# Patient Record
Sex: FEMALE | Race: ASIAN | Employment: FULL TIME | URBAN - NONMETROPOLITAN AREA
[De-identification: names, ages, dates, MRNs, and addresses within clinical notes are randomized per-mention and may not be internally consistent; named-entity substitution may affect disease eponyms.]

---

## 2018-10-14 ENCOUNTER — HOSPITAL ENCOUNTER (EMERGENCY)
Age: 58
Discharge: HOME | End: 2018-10-14

## 2018-10-14 VITALS
DIASTOLIC BLOOD PRESSURE: 60 MMHG | RESPIRATION RATE: 16 BRPM | TEMPERATURE: 97.8 F | SYSTOLIC BLOOD PRESSURE: 116 MMHG | OXYGEN SATURATION: 96 % | HEART RATE: 76 BPM
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
## 2018-10-14 PROCEDURE — 96372 THER/PROPH/DIAG INJ SC/IM: CPT

## 2018-10-14 PROCEDURE — 6360000002 HC RX W HCPCS: Performed by: NURSE PRACTITIONER

## 2018-10-14 PROCEDURE — 99202 OFFICE O/P NEW SF 15 MIN: CPT

## 2018-10-14 RX ORDER — PREDNISONE 10 MG/1
10 TABLET ORAL DAILY
Qty: 21 TABLET | Refills: 0 | Status: SHIPPED | OUTPATIENT
Start: 2018-10-14 | End: 2018-10-24

## 2018-10-14 RX ORDER — METHYLPREDNISOLONE ACETATE 80 MG/ML
80 INJECTION, SUSPENSION INTRA-ARTICULAR; INTRALESIONAL; INTRAMUSCULAR; SOFT TISSUE ONCE
Status: COMPLETED | OUTPATIENT
Start: 2018-10-14 | End: 2018-10-14

## 2018-10-14 RX ADMIN — METHYLPREDNISOLONE ACETATE 80 MG: 80 INJECTION, SUSPENSION INTRA-ARTICULAR; INTRALESIONAL; INTRAMUSCULAR; SOFT TISSUE at 13:04

## 2018-10-14 ASSESSMENT — ENCOUNTER SYMPTOMS
EYE ITCHING: 0
EYE PAIN: 0
SORE THROAT: 0
RHINORRHEA: 0
NAUSEA: 0
DIARRHEA: 0
ABDOMINAL PAIN: 0
TROUBLE SWALLOWING: 0
COUGH: 0
EYE REDNESS: 0
BACK PAIN: 0
VOMITING: 0
CONSTIPATION: 0
WHEEZING: 0
CHEST TIGHTNESS: 0
SHORTNESS OF BREATH: 0
EYE DISCHARGE: 0
SINUS PRESSURE: 0

## 2022-02-11 ENCOUNTER — HOSPITAL ENCOUNTER (OUTPATIENT)
Age: 62
Setting detail: SPECIMEN
Discharge: HOME | End: 2022-02-11

## 2022-02-11 LAB — T. PALLIDUM, IGG: NONREACTIVE

## 2022-02-14 LAB
C. TRACHOMATIS DNA ,URINE: NEGATIVE
N. GONORRHOEAE DNA, URINE: NEGATIVE
QUANTI TB GOLD PLUS: NEGATIVE
QUANTI TB1 MINUS NIL: 0.02 IU/ML (ref 0–0.34)
QUANTI TB2 MINUS NIL: 0.05 IU/ML (ref 0–0.34)
QUANTIFERON MITOGEN: >10 IU/ML
QUANTIFERON NIL: 0.13 IU/ML
SPECIMEN DESCRIPTION: NORMAL

## 2022-02-16 LAB
SOURCE: NORMAL
TRICHOMONAS VAGINALI, MOLECULAR: NEGATIVE

## 2022-05-31 ENCOUNTER — APPOINTMENT (OUTPATIENT)
Dept: GENERAL RADIOLOGY | Age: 62
End: 2022-05-31
Payer: COMMERCIAL

## 2022-05-31 ENCOUNTER — HOSPITAL ENCOUNTER (EMERGENCY)
Age: 62
Discharge: HOME | End: 2022-05-31
Payer: COMMERCIAL

## 2022-05-31 VITALS
RESPIRATION RATE: 14 BRPM | HEIGHT: 64 IN | BODY MASS INDEX: 24.75 KG/M2 | WEIGHT: 145 LBS | TEMPERATURE: 97.8 F | SYSTOLIC BLOOD PRESSURE: 122 MMHG | OXYGEN SATURATION: 99 % | DIASTOLIC BLOOD PRESSURE: 62 MMHG | HEART RATE: 70 BPM

## 2022-05-31 DIAGNOSIS — S80.02XA CONTUSION OF LEFT KNEE, INITIAL ENCOUNTER: Primary | ICD-10-CM

## 2022-05-31 DIAGNOSIS — S80.01XA CONTUSION OF RIGHT KNEE, INITIAL ENCOUNTER: ICD-10-CM

## 2022-05-31 PROCEDURE — 73564 X-RAY EXAM KNEE 4 OR MORE: CPT

## 2022-05-31 PROCEDURE — 99213 OFFICE O/P EST LOW 20 MIN: CPT | Performed by: NURSE PRACTITIONER

## 2022-05-31 PROCEDURE — G0463 HOSPITAL OUTPT CLINIC VISIT: HCPCS

## 2022-05-31 PROCEDURE — 99213 OFFICE O/P EST LOW 20 MIN: CPT

## 2022-05-31 ASSESSMENT — ENCOUNTER SYMPTOMS
NAUSEA: 0
VOMITING: 0

## 2022-05-31 NOTE — ED PROVIDER NOTES
[address]  Urgent Care Encounter       CHIEF COMPLAINT       Chief Complaint   Patient presents with    Knee Injury     fell at work bilateral knee pain       Nurses Notes reviewed and I agree except as noted in the HPI. HISTORY OF PRESENT ILLNESS   Benjamin Thomason is a 57 y.o. female who presents with complaints of bilateral knee pain. Patient fell at work on concrete 5 days ago and has continued pain in both knees over the kneecaps. She is able to walk but has increased pain climbing stairs and squatting. She has applied ice but has not taken any Tylenol or Motrin. Their chiropractor performed a \"tuning fork test\" and indicated to the patient that there may be a fracture in her knee. The history is provided by the patient. REVIEW OF SYSTEMS     Review of Systems   Constitutional: Negative for fever. Gastrointestinal: Negative for nausea and vomiting. Musculoskeletal:        Bilateral knee injury/pain   Skin: Negative for wound. Neurological: Negative for numbness. PAST MEDICAL HISTORY   History reviewed. No pertinent past medical history. SURGICALHISTORY     Patient  has no past surgical history on file. CURRENT MEDICATIONS       There are no discharge medications for this patient. ALLERGIES     Patient is has No Known Allergies. Patients   Immunization History   Administered Date(s) Administered    COVID-19, J&J, PF, 0.5 mL 02/11/2022       FAMILY HISTORY     Patient's family history is not on file. SOCIAL HISTORY     Patient  reports that she has never smoked. She has never used smokeless tobacco. She reports that she does not drink alcohol and does not use drugs. PHYSICAL EXAM     ED TRIAGE VITALS  BP: 122/62, Temp: 97.8 °F (36.6 °C), Heart Rate: 70, Resp: 14, SpO2: 99 %,Estimated body mass index is 24.89 kg/m² as calculated from the following:    Height as of this encounter: 5' 4\" (1.626 m).     Weight as of this encounter: 145 lb (65.8 kg).,No LMP recorded. Patient is postmenopausal.    Physical Exam  Vitals and nursing note reviewed. Constitutional:       General: She is not in acute distress. Appearance: She is well-developed. HENT:      Head: Normocephalic and atraumatic. Pulmonary:      Effort: Pulmonary effort is normal. No respiratory distress. Musculoskeletal:      Right knee: Swelling (Mild anteriorly) present. No erythema or ecchymosis. Normal range of motion. Tenderness (Patella) present. No patellar tendon tenderness. No LCL laxity. Normal patellar mobility. Left knee: Swelling (Mild anteriorly) present. No erythema or ecchymosis. Normal range of motion. Tenderness (Patella) present. No patellar tendon tenderness. No LCL laxity. Normal patellar mobility. Skin:     General: Skin is warm and dry. Neurological:      General: No focal deficit present. Mental Status: She is alert and oriented to person, place, and time. Psychiatric:         Mood and Affect: Mood normal.         Speech: Speech normal.         Behavior: Behavior normal. Behavior is cooperative. DIAGNOSTIC RESULTS     Labs:No results found for this visit on 05/31/22. IMAGING:    XR KNEE LEFT (MIN 4 VIEWS)   Final Result   1. No acute bony abnormality. Mild osteoarthritis. This document has been electronically signed by: Shaun Lorenz MD on    05/31/2022 07:22 PM      XR KNEE RIGHT (MIN 4 VIEWS)   Final Result   1. There appears to be at least one small posterior intra-articular body. 2. Mild osteoarthritis. This document has been electronically signed by: Shaun Lorenz MD on    05/31/2022 07:19 PM            EKG:      URGENT CARE COURSE:     Vitals:    05/31/22 1800   BP: 122/62   Pulse: 70   Resp: 14   Temp: 97.8 °F (36.6 °C)   TempSrc: Temporal   SpO2: 99%   Weight: 145 lb (65.8 kg)   Height: 5' 4\" (1.626 m)       Medications - No data to display         PROCEDURES:  None    FINAL IMPRESSION      1.  Contusion of left knee, initial encounter    2. Contusion of right knee, initial encounter          DISPOSITION/ PLAN     Patient presents with contusion of bilateral knees after a fall at work. .  X-rays were negative for acute fracture dislocation. Tylenol and or Motrin for pain. Ice as desired. Can use Ace wraps or pull-up knee sleeves for support and comfort. Follow-up with occupational health office with any further needs regarding this injury. Further instructions were outlined verbally and in the patient's discharge instructions. All the patient's questions were answered. The patient/parent agreed with the plan and was discharged from the [facility] in good condition. PATIENT REFERRED TO:  No primary care provider on file. No primary physician on file. DISCHARGE MEDICATIONS:  There are no discharge medications for this patient. There are no discharge medications for this patient. There are no discharge medications for this patient.       ASHLIE Carter CNP    (Please note that portions of this note were completed with a voice recognition program. Efforts were made to edit the dictations but occasionally words are mis-transcribed.)         ASHLIE Carter CNP  06/01/22 1011

## 2022-05-31 NOTE — ED NOTES
No change in patients condition. Discharge instructions discussed with pt and pt verbalized understanding of info given. Pt left stable and ambulated to exit.        Marjorie Adkins RN  05/31/22 8292

## 2023-03-10 ENCOUNTER — HOSPITAL ENCOUNTER (EMERGENCY)
Age: 63
Discharge: HOME | End: 2023-03-10
Payer: COMMERCIAL

## 2023-03-10 VITALS
OXYGEN SATURATION: 98 % | HEART RATE: 71 BPM | RESPIRATION RATE: 14 BRPM | TEMPERATURE: 98.1 F | SYSTOLIC BLOOD PRESSURE: 139 MMHG | DIASTOLIC BLOOD PRESSURE: 72 MMHG

## 2023-03-10 DIAGNOSIS — M77.8 LEFT ELBOW TENDINITIS: Primary | ICD-10-CM

## 2023-03-10 PROCEDURE — 99213 OFFICE O/P EST LOW 20 MIN: CPT

## 2023-03-10 RX ORDER — IBUPROFEN 400 MG/1
400 TABLET ORAL EVERY 6 HOURS PRN
COMMUNITY

## 2023-03-10 RX ORDER — ACETAMINOPHEN 500 MG
1000 TABLET ORAL EVERY 6 HOURS PRN
COMMUNITY

## 2023-03-10 ASSESSMENT — ENCOUNTER SYMPTOMS
SORE THROAT: 0
DIARRHEA: 0
SHORTNESS OF BREATH: 0
CHEST TIGHTNESS: 0
NAUSEA: 0
RHINORRHEA: 0
COUGH: 0
VOMITING: 0

## 2023-03-10 ASSESSMENT — PAIN DESCRIPTION - FREQUENCY: FREQUENCY: INTERMITTENT

## 2023-03-10 ASSESSMENT — PAIN - FUNCTIONAL ASSESSMENT
PAIN_FUNCTIONAL_ASSESSMENT: ACTIVITIES ARE NOT PREVENTED
PAIN_FUNCTIONAL_ASSESSMENT: 0-10

## 2023-03-10 ASSESSMENT — PAIN SCALES - GENERAL: PAINLEVEL_OUTOF10: 6

## 2023-03-10 ASSESSMENT — PAIN DESCRIPTION - ONSET: ONSET: GRADUAL

## 2023-03-10 ASSESSMENT — PAIN DESCRIPTION - DESCRIPTORS: DESCRIPTORS: ACHING;PINS AND NEEDLES;TINGLING

## 2023-03-10 ASSESSMENT — PAIN DESCRIPTION - ORIENTATION: ORIENTATION: LEFT

## 2023-03-10 ASSESSMENT — PAIN DESCRIPTION - PAIN TYPE: TYPE: ACUTE PAIN

## 2023-03-10 NOTE — ED PROVIDER NOTES
[facility]  Urgent Care Encounter       CHIEF COMPLAINT       Chief Complaint   Patient presents with    Arm Pain     Left elbow pain from working on a station at work continuously          Nurses Notes reviewed and I agree except as noted in the HPI.  HISTORY OF PRESENT ILLNESS   Isabela Fish is a 62 y.o. female who presents to the [facility] for evaluation of left elbow pain.  She reports the pain started roughly 1 year ago and has been intermittent.  She reports that the pain is worse after working a certain position at work.  She reports that she did discuss this with her supervisors.  She reports that they did take her off of this position, but returned her back to this position.  She reports the left elbow pain is worse with movement.  She is hoping to get some kind of note so she does not have to do this station at work that causes her left arm pain.    The history is provided by the patient. No  was used.     REVIEW OF SYSTEMS     Review of Systems   Constitutional:  Negative for activity change, appetite change, chills, fatigue and fever.   HENT:  Negative for ear discharge, ear pain, rhinorrhea and sore throat.    Respiratory:  Negative for cough, chest tightness and shortness of breath.    Cardiovascular:  Negative for chest pain.   Gastrointestinal:  Negative for diarrhea, nausea and vomiting.   Genitourinary:  Negative for dysuria.   Musculoskeletal:  Positive for arthralgias.   Skin:  Negative for rash.   Allergic/Immunologic: Negative for environmental allergies and food allergies.   Neurological:  Negative for dizziness and headaches.     PAST MEDICAL HISTORY   No past medical history on file.    SURGICALHISTORY     Patient  has no past surgical history on file.    CURRENT MEDICATIONS       Previous Medications    No medications on file       ALLERGIES     Patient is has No Known Allergies.    Patients   Immunization History  Administered Date(s) Administered    COVID-19, J&J, (age 18y+), IM, 0.5 mL 02/11/2022       FAMILY HISTORY     Patient's family history is not on file. SOCIAL HISTORY     Patient  reports that she has never smoked. She has never used smokeless tobacco. She reports that she does not drink alcohol and does not use drugs. PHYSICAL EXAM     ED TRIAGE VITALS   ,  ,  ,  ,  ,Estimated body mass index is 24.89 kg/m² as calculated from the following:    Height as of 5/31/22: 5' 4\" (1.626 m). Weight as of 5/31/22: 145 lb (65.8 kg). ,No LMP recorded. Patient is postmenopausal.    Physical Exam  Vitals and nursing note reviewed. Constitutional:       General: She is not in acute distress. Appearance: Normal appearance. She is not ill-appearing, toxic-appearing or diaphoretic. HENT:      Head: Normocephalic. Right Ear: Ear canal and external ear normal.      Left Ear: Ear canal and external ear normal.      Nose: Nose normal. No congestion or rhinorrhea. Mouth/Throat:      Mouth: Mucous membranes are moist.      Pharynx: Oropharynx is clear. No oropharyngeal exudate or posterior oropharyngeal erythema. Cardiovascular:      Rate and Rhythm: Normal rate. Pulses: Normal pulses. Pulmonary:      Effort: Pulmonary effort is normal. No respiratory distress. Breath sounds: No stridor. No wheezing or rhonchi. Abdominal:      General: Abdomen is flat. Bowel sounds are normal.      Palpations: Abdomen is soft. Musculoskeletal:         General: No swelling or tenderness. Normal range of motion. Arms:       Cervical back: Normal range of motion. Neurological:      General: No focal deficit present. Mental Status: She is alert and oriented to person, place, and time. Psychiatric:         Mood and Affect: Mood normal.         Behavior: Behavior normal.       DIAGNOSTIC RESULTS     Labs:No results found for this visit on 03/10/23.     IMAGING:    No orders to display         EKG: None      URGENT CARE COURSE:     There were no vitals filed for this visit. Medications - No data to display         PROCEDURES:  None    FINAL IMPRESSION      1. Left elbow tendinitis          DISPOSITION/ PLAN     Patient seen and evaluated for left elbow pain. I did discuss with the patient that her symptoms likely represent a tendinitis. She is instructed to follow-up with the occupational health through [facility] for further management of this condition. I did discuss ordering an x-ray at this time would likely not show any acute abnormalities. She is okay to return to work at full duty. She is instructed use over-the-counter Tylenol and Motrin for pain or fever. She can also follow-up with her PCP if needed. She is agreeable to above plan and denies questions or concerns at this time. PATIENT REFERRED TO:  No primary care provider on file. No primary physician on file. DISCHARGE MEDICATIONS:  New Prescriptions    No medications on file       Discontinued Medications    No medications on file       There are no discharge medications for this patient.       ASHLIE Hassan CNP    (Please note that portions of this note were completed with a voice recognition program. Efforts were made to edit the dictations but occasionally words are mis-transcribed.)            ASHLIE Hassan CNP  03/10/23 ASHLIE Swan CNP  03/10/23 0207

## 2023-03-10 NOTE — ED NOTES
Pt here with c/o pain to the left elbow from repeatedly using in the same motion doing her job at work. Pt states that she has been doing this station/job almost daily since August and she has been having varying degrees of pain when she has to do this #100-110 station/job. Pt adds that the days she does not have to this job and is able to rest the pain does get better. Pt reports that the pain is over the elbow and radiates up to the shoulder and down to the hand.       Jeana Rogers RN  03/10/23 7335

## 2023-04-03 ENCOUNTER — HOSPITAL ENCOUNTER (OUTPATIENT)
Dept: OCCUPATIONAL THERAPY | Age: 63
Setting detail: THERAPIES SERIES
Discharge: HOME | End: 2023-04-03

## 2023-04-03 PROCEDURE — 97110 THERAPEUTIC EXERCISES: CPT

## 2023-04-03 PROCEDURE — 97165 OT EVAL LOW COMPLEX 30 MIN: CPT

## 2023-04-03 PROCEDURE — 97033 APP MDLTY 1+IONTPHRSIS EA 15: CPT

## 2023-04-03 NOTE — PROGRESS NOTES
functional level    Short Term Goals:  Time Frame: 4 weeks   Patient will demonstrate ability to fully extend the left elbow without reports of pain to increase ability to reach into upper cupboards for dishes  Patient will report pain with activity no greater than 5/10 on the left and 2/10 on the right to increase ability to perform work duties  Patient will be independent with HEP to increase ability for performance of house chores. Long Term Goals:  Time Frame: 12 weeks   Patient will increase right  with elbow bent to 40# and left  to 30# with elbow bent to increase ability to open a jar. Patient will report average pain in the left no greater than 1/10 to increase ability to perform work duties. Patient Education:   [x]  HEP/Education Completed: Plan of Care, Goals  [employer] Access Code for HEP: elbow extension passive stretch, self massage to lateral epicondyle   []  No new Education completed  []  Reviewed Prior HEP      [x]  Patient verbalized and/or demonstrated understanding of education provided. []  Patient unable to verbalize and/or demonstrate understanding of education provided. Will continue education. [x]  Barriers to learning: use of     PLAN:  Treatment Recommendations: Strengthening, Range of Motion, Manual Therapy - Soft Tissue Mobilization, Manual Therapy - Joint Manipulation, Home Exercise Program, Patient Education, Integrative Dry Needling, Modalities, and Splint Fabrications/Modifications    [x]  Plan of care initiated. Plan to see patient 2 times per week for 12 weeks to address the treatment planned outlined above.   []  Continue with current plan of care  []  Modify plan of care as follows:    []  Hold pending physician visit  []  Discharge    Time In 1530   Time Out 1645   Timed Code Minutes: 10 min   Total Treatment Time: 75 min       Electronically Signed by: GREGG Esteban, OTR/L [id number]

## 2023-04-06 ENCOUNTER — HOSPITAL ENCOUNTER (OUTPATIENT)
Dept: OCCUPATIONAL THERAPY | Age: 63
Setting detail: THERAPIES SERIES
Discharge: HOME | End: 2023-04-06

## 2023-04-06 PROCEDURE — 97033 APP MDLTY 1+IONTPHRSIS EA 15: CPT

## 2023-04-06 PROCEDURE — 97140 MANUAL THERAPY 1/> REGIONS: CPT

## 2023-04-06 PROCEDURE — 97110 THERAPEUTIC EXERCISES: CPT

## 2023-04-06 NOTE — PROGRESS NOTES
HEP: elbow extension passive stretch, self massage to lateral epicondyle   []  No new Education completed  []  Reviewed Prior HEP      [x]  Patient verbalized and/or demonstrated understanding of education provided. []  Patient unable to verbalize and/or demonstrate understanding of education provided. Will continue education. [x]  Barriers to learning: use of     PLAN:  Treatment Recommendations: Strengthening, Range of Motion, Manual Therapy - Soft Tissue Mobilization, Manual Therapy - Joint Manipulation, Home Exercise Program, Patient Education, Integrative Dry Needling, Modalities, and Splint Fabrications/Modifications    []  Plan of care initiated. Plan to see patient 2 times per week for 12 weeks to address the treatment planned outlined above.   [x]  Continue with current plan of care  []  Modify plan of care as follows:    []  Hold pending physician visit  []  Discharge    Time In 1500   Time Out 1600   Timed Code Minutes: 30 min   Total Treatment Time: 60 min       Electronically Signed by: GREGG Nunez, OTR/L [id number]

## 2023-04-17 ENCOUNTER — HOSPITAL ENCOUNTER (OUTPATIENT)
Dept: OCCUPATIONAL THERAPY | Age: 63
Setting detail: THERAPIES SERIES
Discharge: HOME | End: 2023-04-17

## 2023-04-17 PROCEDURE — 97033 APP MDLTY 1+IONTPHRSIS EA 15: CPT

## 2023-04-17 PROCEDURE — 97140 MANUAL THERAPY 1/> REGIONS: CPT

## 2023-04-17 PROCEDURE — 97110 THERAPEUTIC EXERCISES: CPT

## 2023-04-17 NOTE — PROGRESS NOTES
[address]  [] EVALUATION  [x] DAILY NOTE (LAND) [] DAILY NOTE (AQUATIC ) [] PROGRESS NOTE [] DISCHARGE NOTE    [] [facility]   [x] [place] [address]    [] [facility]   [] Sivan Nava    Date: 2023  Patient Name:  Denae Castillo  : 1960  MRN: [id number]  CSN: [id number]    Referring Practitioner ASHLIE Arias - *   Diagnosis Lateral epicondylitis, left elbow [M77.12]  Lateral epicondylitis, right elbow [M77.11]    Treatment Diagnosis M77.12, M77.11   Date of Evaluation 4/3/23      Functional Outcome Measure Used UEFS   Functional Outcome Score 45/80 (4/3/23)       Insurance: Primary: Payor: / ,   Secondary:    Authorization Information: PRE CERTIFICATION REQUIRED: NO  INSURANCE THERAPY BENEFIT:  12 VISITS ALLOWED 2-3 TIMES A WEEK    Service ends 23   Visit # 5, 10 for progress note   Visits Allowed: 12   Recertification Date:    Physician Follow-Up: ?   Physician Orders: Left elbow with iontophoresis, 2-3x a week for 12 visits. Pertinent History: Per charting, patient reports that her pain started about a year ago and is intermittent. Report that she has pain after working a certain task at work. Reports that she was taking off of this task/job, but has returned to this task/job. Currently working at [address] [address] [place] [address],[facility] [address]. No lifting over 5#.      has no past medical history on file. SUBJECTIVE:  Patient reports that the massage was very beneficial from last visit.         OBJECTIVE:    TREATMENT   Precautions: Left pain greater than right, need    Pain:  4/10 left,  3/10 right elbow    X in shaded column indicates Activity Completed Today   Modalities Parameters/  Location  Notes/Comments   Ionto right and left lateral epicondyle at the region of pain 2ml, medium pad, right 0.6 mA, 25 minutes; left 0.5 mA, 25 minutes x Decreased due to patient request

## 2023-04-20 ENCOUNTER — HOSPITAL ENCOUNTER (OUTPATIENT)
Dept: OCCUPATIONAL THERAPY | Age: 63
Setting detail: THERAPIES SERIES
Discharge: HOME | End: 2023-04-20

## 2023-04-20 PROCEDURE — 97140 MANUAL THERAPY 1/> REGIONS: CPT

## 2023-04-20 PROCEDURE — 97033 APP MDLTY 1+IONTPHRSIS EA 15: CPT

## 2023-04-20 PROCEDURE — 97110 THERAPEUTIC EXERCISES: CPT

## 2023-04-20 NOTE — PROGRESS NOTES
[address]  [] EVALUATION  [x] DAILY NOTE (LAND) [] DAILY NOTE (AQUATIC ) [] PROGRESS NOTE [] DISCHARGE NOTE    [] [facility] [place]   [x] [place] [address]    [] [facility]   [] Kassie Shoulder    Date: 2023  Patient Name:  Rivera Dai  : 1960  MRN: [id number]  CSN: [id number]    Referring Practitioner ASHLIE Barraza - *   Diagnosis Lateral epicondylitis, left elbow [M77.12]  Lateral epicondylitis, right elbow [M77.11]    Treatment Diagnosis M77.12, M77.11   Date of Evaluation 4/3/23      Functional Outcome Measure Used UEFS   Functional Outcome Score 45/80 (4/3/23)       Insurance: Primary: Payor: / ,   Secondary:    Authorization Information: PRE CERTIFICATION REQUIRED: NO  INSURANCE THERAPY BENEFIT:  12 VISITS ALLOWED 2-3 TIMES A WEEK    Service ends 23   Visit # 5, 5/10 for progress note   Visits Allowed: 12   Recertification Date: 3/20/94   Physician Follow-Up: ?   Physician Orders: Left elbow with iontophoresis, 2-3x a week for 12 visits. Pertinent History: Per charting, patient reports that her pain started about a year ago and is intermittent. Report that she has pain after working a certain task at work. Reports that she was taking off of this task/job, but has returned to this task/job. Currently working at [address] [employer] [address] [place] [address],[facility] [address]. No lifting over 5#.      has no past medical history on file. SUBJECTIVE:  Patient reports that the pain continues to decrease.          OBJECTIVE:    TREATMENT   Precautions: Left pain greater than right, need    Pain:  3/10 left,  2/10 right elbow    X in shaded column indicates Activity Completed Today   Modalities Parameters/  Location  Notes/Comments   Ionto right and left lateral epicondyle at the region of pain 2ml, medium pad, right 1.6 mA, ; left 0.9 mA, dose 40 x Intensity at patient tolerance, time to patients request.   Dry skin noted at

## 2023-04-24 ENCOUNTER — HOSPITAL ENCOUNTER (OUTPATIENT)
Dept: OCCUPATIONAL THERAPY | Age: 63
Setting detail: THERAPIES SERIES
Discharge: HOME | End: 2023-04-24

## 2023-04-24 PROCEDURE — 97033 APP MDLTY 1+IONTPHRSIS EA 15: CPT

## 2023-04-24 PROCEDURE — 97110 THERAPEUTIC EXERCISES: CPT

## 2023-04-24 PROCEDURE — 97140 MANUAL THERAPY 1/> REGIONS: CPT

## 2023-04-24 NOTE — PROGRESS NOTES
[address]  [] EVALUATION  [x] DAILY NOTE (LAND) [] DAILY NOTE (AQUATIC ) [] PROGRESS NOTE [] DISCHARGE NOTE    [] [facility]   [x] [address]    [] [facility]   [] Ronny Ramos    Date: 2023  Patient Name:  Katerine Lan  : 1960  MRN: [id number]  CSN: [id number]    Referring Practitioner ASHLIE Saavedra - *   Diagnosis Lateral epicondylitis, left elbow [M77.12]  Lateral epicondylitis, right elbow [M77.11]    Treatment Diagnosis M77.12, M77.11   Date of Evaluation 4/3/23      Functional Outcome Measure Used UEFS   Functional Outcome Score 45/80 (4/3/23)       Insurance: Primary: Payor: / ,   Secondary:    Authorization Information: PRE CERTIFICATION REQUIRED: NO  INSURANCE THERAPY BENEFIT:  12 VISITS ALLOWED 2-3 TIMES A WEEK    Service ends 23   Visit # 7, 7/10 for progress note   Visits Allowed: 12   Recertification Date:    Physician Follow-Up: ?   Physician Orders: Left elbow with iontophoresis, 2-3x a week for 12 visits. Pertinent History: Per charting, patient reports that her pain started about a year ago and is intermittent. Report that she has pain after working a certain task at work. Reports that she was taking off of this task/job, but has returned to this task/job. Currently working at [address] [employer] [address] [place] [address],[facility] [address]. No lifting over 5#.      has no past medical history on file.     SUBJECTIVE:  Patient reports that her arms feel a little better       OBJECTIVE:    TREATMENT   Precautions: Left pain greater than right, need    Pain:  3/10 left,  2/10 right elbow    X in shaded column indicates Activity Completed Today   Modalities Parameters/  Location  Notes/Comments   Ionto right and left lateral epicondyle at the region of pain 2ml, medium pad, right 0.9 mA, ; left 1 mA, dose 40 x Intensity at patient tolerance, patient in agreement for full time this date  Dry skin

## 2023-04-27 ENCOUNTER — HOSPITAL ENCOUNTER (OUTPATIENT)
Dept: OCCUPATIONAL THERAPY | Age: 63
Setting detail: THERAPIES SERIES
Discharge: HOME | End: 2023-04-27

## 2023-04-27 PROCEDURE — 97033 APP MDLTY 1+IONTPHRSIS EA 15: CPT

## 2023-04-27 PROCEDURE — 97110 THERAPEUTIC EXERCISES: CPT

## 2023-04-27 PROCEDURE — 97140 MANUAL THERAPY 1/> REGIONS: CPT

## 2023-04-27 NOTE — PROGRESS NOTES
increase ability for performance of house chores. Long Term Goals:  Time Frame: 12 weeks   Patient will increase right  with elbow bent to 40# and left  to 30# with elbow bent to increase ability to open a jar. Patient will report average pain in the left no greater than 1/10 to increase ability to perform work duties. Patient Education:   [x]  HEP/Education Completed: Plan of Care, Goals  [facility] Access Code for HEP: elbow extension passive stretch, self massage to lateral epicondyle   Elbow bent, palm down wrist flexion and extension, supination/pronation and elbow flexion and extension 1# mid range. []  No new Education completed  []  Reviewed Prior HEP      [x]  Patient verbalized and/or demonstrated understanding of education provided. []  Patient unable to verbalize and/or demonstrate understanding of education provided. Will continue education. [x]  Barriers to learning: use of     PLAN:  Treatment Recommendations: Strengthening, Range of Motion, Manual Therapy - Soft Tissue Mobilization, Manual Therapy - Joint Manipulation, Home Exercise Program, Patient Education, Integrative Dry Needling, Modalities, and Splint Fabrications/Modifications    []  Plan of care initiated. Plan to see patient 2 times per week for 12 weeks to address the treatment planned outlined above.   [x]  Continue with current plan of care  []  Modify plan of care as follows:    []  Hold pending physician visit  []  Discharge    CPT CODE TIME-IN TIME-OUT TOTAL TIME   There ex [id number] 0109 15   Manual [id number] 2812 8037 15   Ionto 70472 1544 1625 40                     TOTAL SESSION 1515 1625 70 minutes         Electronically Signed by: Yas Vaughn, GREGG, OTR/L [id number]

## 2023-05-02 ENCOUNTER — HOSPITAL ENCOUNTER (OUTPATIENT)
Dept: OCCUPATIONAL THERAPY | Age: 63
Setting detail: THERAPIES SERIES
Discharge: HOME | End: 2023-05-02

## 2023-05-02 PROCEDURE — 97140 MANUAL THERAPY 1/> REGIONS: CPT

## 2023-05-02 PROCEDURE — 97110 THERAPEUTIC EXERCISES: CPT

## 2023-05-02 PROCEDURE — 97033 APP MDLTY 1+IONTPHRSIS EA 15: CPT

## 2023-05-02 NOTE — PROGRESS NOTES
left  to 30# with elbow bent to increase ability to open a jar. Patient will report average pain in the left no greater than 1/10 to increase ability to perform work duties. Patient Education:   [x]  HEP/Education Completed: Plan of Care, Goals  [facility] Access Code for HEP: elbow extension passive stretch, self massage to lateral epicondyle   Elbow bent, palm down wrist flexion and extension, supination/pronation and elbow flexion and extension 1# mid range. []  No new Education completed  []  Reviewed Prior HEP      [x]  Patient verbalized and/or demonstrated understanding of education provided. []  Patient unable to verbalize and/or demonstrate understanding of education provided. Will continue education. [x]  Barriers to learning: use of     PLAN:  Treatment Recommendations: Strengthening, Range of Motion, Manual Therapy - Soft Tissue Mobilization, Manual Therapy - Joint Manipulation, Home Exercise Program, Patient Education, Integrative Dry Needling, Modalities, and Splint Fabrications/Modifications    []  Plan of care initiated. Plan to see patient 2 times per week for 12 weeks to address the treatment planned outlined above.   [x]  Continue with current plan of care  []  Modify plan of care as follows:    []  Hold pending physician visit  []  Discharge    CPT CODE TIME-IN TIME-OUT TOTAL TIME   There ex [id number] 2905 8774 15   Manual [id number] 6649 0906 15   Ionto 32632 1544 2946 40                     TOTAL SESSION 1515 1625 70 minutes         Electronically Signed by: GREGG Dhaliwal, OTR/L [id number]

## 2023-05-04 ENCOUNTER — HOSPITAL ENCOUNTER (OUTPATIENT)
Dept: OCCUPATIONAL THERAPY | Age: 63
Setting detail: THERAPIES SERIES
End: 2023-05-04

## 2023-05-09 ENCOUNTER — HOSPITAL ENCOUNTER (OUTPATIENT)
Dept: OCCUPATIONAL THERAPY | Age: 63
Setting detail: THERAPIES SERIES
Discharge: HOME | End: 2023-05-09

## 2023-05-09 NOTE — PROGRESS NOTES
[address]  OCCUPATIONAL THERAPY COMMUNICATION NOTE  [facility] OT        Date: 2023  Patient Name: Lakisha Falcon        CSN: [id number]   : 1960  (60 y.o.)  Gender: female       Patient into the clinic this date to request to cancel her current appointments due to other concerns in her life at this time. Patient reported that she would return to therapy after the  if additional therapy is needed/desired. Called SUAD Reyes at [phone] extension [phone] to request a date extension. I was informed that her claim number had been disallowed. Notified clerical and supervisor and obtained copy of insurance card for precert. Patient provided with a contact at [facility], Felicity Farris, at [phone] if she has questions about the disallowed claim.       Kaushik Cervantes, MOT, OTR/L 9772

## 2023-05-11 ENCOUNTER — APPOINTMENT (OUTPATIENT)
Dept: OCCUPATIONAL THERAPY | Age: 63
End: 2023-05-11

## 2023-05-26 ENCOUNTER — HOSPITAL ENCOUNTER (EMERGENCY)
Age: 63
Discharge: HOME | End: 2023-05-26
Payer: COMMERCIAL

## 2023-05-26 VITALS
SYSTOLIC BLOOD PRESSURE: 135 MMHG | OXYGEN SATURATION: 95 % | DIASTOLIC BLOOD PRESSURE: 72 MMHG | BODY MASS INDEX: 23.32 KG/M2 | WEIGHT: 140 LBS | HEART RATE: 87 BPM | TEMPERATURE: 98.2 F | HEIGHT: 65 IN | RESPIRATION RATE: 16 BRPM

## 2023-05-26 DIAGNOSIS — U07.1 COVID-19: Primary | ICD-10-CM

## 2023-05-26 LAB
FLUAV AG SPEC QL: NEGATIVE
FLUBV AG SPEC QL: NEGATIVE
S PYO AG THROAT QL: NEGATIVE
SARS-COV-2 RDRP RESP QL NAA+PROBE: DETECTED

## 2023-05-26 PROCEDURE — 99212 OFFICE O/P EST SF 10 MIN: CPT | Performed by: NURSE PRACTITIONER

## 2023-05-26 PROCEDURE — 87635 SARS-COV-2 COVID-19 AMP PRB: CPT

## 2023-05-26 PROCEDURE — 99213 OFFICE O/P EST LOW 20 MIN: CPT

## 2023-05-26 PROCEDURE — 87651 STREP A DNA AMP PROBE: CPT

## 2023-05-26 PROCEDURE — 87804 INFLUENZA ASSAY W/OPTIC: CPT

## 2023-05-26 ASSESSMENT — ENCOUNTER SYMPTOMS
COUGH: 1
SHORTNESS OF BREATH: 0
CHEST TIGHTNESS: 0
NAUSEA: 0
DIARRHEA: 0
RHINORRHEA: 0
SORE THROAT: 1
VOMITING: 0

## 2023-05-26 ASSESSMENT — PAIN - FUNCTIONAL ASSESSMENT
PAIN_FUNCTIONAL_ASSESSMENT: ACTIVITIES ARE NOT PREVENTED
PAIN_FUNCTIONAL_ASSESSMENT: 0-10

## 2023-05-26 ASSESSMENT — PAIN DESCRIPTION - PAIN TYPE: TYPE: ACUTE PAIN

## 2023-05-26 ASSESSMENT — PAIN DESCRIPTION - FREQUENCY: FREQUENCY: CONTINUOUS

## 2023-05-26 ASSESSMENT — PAIN SCALES - GENERAL: PAINLEVEL_OUTOF10: 6

## 2023-05-26 ASSESSMENT — PAIN DESCRIPTION - ONSET: ONSET: GRADUAL

## 2023-05-26 ASSESSMENT — PAIN DESCRIPTION - DESCRIPTORS: DESCRIPTORS: ACHING

## 2023-05-26 ASSESSMENT — PAIN DESCRIPTION - LOCATION: LOCATION: THROAT;HEAD

## 2023-05-26 NOTE — ED NOTES
Pt given discharge instructions per Ap Lucas cnp through  service.       Olga Sanders RN  05/26/23 [id number]

## 2023-05-26 NOTE — ED NOTES
Influenza, covid, and strep swabs obtained without difficulty and tolerated well.       Kimmie Lee RN  05/26/23 3661

## 2023-05-26 NOTE — ED PROVIDER NOTES
[address]  Urgent Care Encounter       CHIEF COMPLAINT       Chief Complaint   Patient presents with    Cough    Pharyngitis    Fever     Recent airplane travel and now has had symptoms for about 3 days       Nurses Notes reviewed and I agree except as noted in the HPI. HISTORY OF PRESENT ILLNESS   Cliff Lancaster is a 60 y.o. female who presents to the [facility] for evaluation of cough and pharyngitis. She does report recent travel to [place] for 2 to 3 days. She reports her symptoms started roughly 3 days ago. She reports cough, pharyngitis, and fever. She does report headache. She denies nausea, vomiting or diarrhea. She does speak [place] and we are using the language line. The history is provided by the patient. A  was used. REVIEW OF SYSTEMS     Review of Systems   Constitutional:  Positive for fever. Negative for activity change, appetite change, chills and fatigue. HENT:  Positive for sore throat. Negative for ear discharge, ear pain and rhinorrhea. Respiratory:  Positive for cough. Negative for chest tightness and shortness of breath. Cardiovascular:  Negative for chest pain. Gastrointestinal:  Negative for diarrhea, nausea and vomiting. Genitourinary:  Negative for dysuria. Skin:  Negative for rash. Allergic/Immunologic: Negative for environmental allergies and food allergies. Neurological:  Positive for headaches. Negative for dizziness. PAST MEDICAL HISTORY   History reviewed. No pertinent past medical history. SURGICALHISTORY     Patient  has no past surgical history on file.     CURRENT MEDICATIONS       Discharge Medication List as of 5/26/2023 10:25 AM        CONTINUE these medications which have NOT CHANGED    Details   acetaminophen (TYLENOL) 500 MG tablet Take 1,000 mg by mouth every 6 hours as needed for PainHistorical Med      ibuprofen (ADVIL;MOTRIN) 400 MG tablet Take 1 tablet by mouth every 6

## 2023-06-30 ENCOUNTER — TELEPHONE (OUTPATIENT)
Dept: FAMILY MEDICINE CLINIC | Age: 63
End: 2023-06-30

## 2023-07-13 ENCOUNTER — OFFICE VISIT (OUTPATIENT)
Dept: FAMILY MEDICINE CLINIC | Age: 63
End: 2023-07-13
Payer: COMMERCIAL

## 2023-07-13 ENCOUNTER — HOSPITAL ENCOUNTER (OUTPATIENT)
Dept: GENERAL RADIOLOGY | Age: 63
Discharge: HOME | End: 2023-07-13
Payer: COMMERCIAL

## 2023-07-13 ENCOUNTER — HOSPITAL ENCOUNTER (OUTPATIENT)
Age: 63
Discharge: HOME | End: 2023-07-13
Payer: COMMERCIAL

## 2023-07-13 VITALS
TEMPERATURE: 98.1 F | RESPIRATION RATE: 16 BRPM | HEART RATE: 92 BPM | SYSTOLIC BLOOD PRESSURE: 136 MMHG | WEIGHT: 152 LBS | BODY MASS INDEX: 25.33 KG/M2 | OXYGEN SATURATION: 97 % | DIASTOLIC BLOOD PRESSURE: 88 MMHG | HEIGHT: 65 IN

## 2023-07-13 DIAGNOSIS — R06.00 DYSPNEA, UNSPECIFIED TYPE: ICD-10-CM

## 2023-07-13 DIAGNOSIS — Z13.220 LIPID SCREENING: ICD-10-CM

## 2023-07-13 DIAGNOSIS — Z00.01 ENCOUNTER FOR WELL ADULT EXAM WITH ABNORMAL FINDINGS: Primary | ICD-10-CM

## 2023-07-13 DIAGNOSIS — Z13.1 SCREENING FOR DIABETES MELLITUS: ICD-10-CM

## 2023-07-13 PROCEDURE — 71046 X-RAY EXAM CHEST 2 VIEWS: CPT

## 2023-07-13 PROCEDURE — 99396 PREV VISIT EST AGE 40-64: CPT | Performed by: NURSE PRACTITIONER

## 2023-07-13 SDOH — ECONOMIC STABILITY: FOOD INSECURITY: WITHIN THE PAST 12 MONTHS, YOU WORRIED THAT YOUR FOOD WOULD RUN OUT BEFORE YOU GOT MONEY TO BUY MORE.: NEVER TRUE

## 2023-07-13 SDOH — ECONOMIC STABILITY: INCOME INSECURITY: HOW HARD IS IT FOR YOU TO PAY FOR THE VERY BASICS LIKE FOOD, HOUSING, MEDICAL CARE, AND HEATING?: NOT HARD AT ALL

## 2023-07-13 SDOH — ECONOMIC STABILITY: FOOD INSECURITY: WITHIN THE PAST 12 MONTHS, THE FOOD YOU BOUGHT JUST DIDN'T LAST AND YOU DIDN'T HAVE MONEY TO GET MORE.: NEVER TRUE

## 2023-07-13 SDOH — ECONOMIC STABILITY: HOUSING INSECURITY
IN THE LAST 12 MONTHS, WAS THERE A TIME WHEN YOU DID NOT HAVE A STEADY PLACE TO SLEEP OR SLEPT IN A SHELTER (INCLUDING NOW)?: NO

## 2023-07-13 ASSESSMENT — PATIENT HEALTH QUESTIONNAIRE - PHQ9
SUM OF ALL RESPONSES TO PHQ QUESTIONS 1-9: 0
1. LITTLE INTEREST OR PLEASURE IN DOING THINGS: 0
SUM OF ALL RESPONSES TO PHQ QUESTIONS 1-9: 0
SUM OF ALL RESPONSES TO PHQ9 QUESTIONS 1 & 2: 0
2. FEELING DOWN, DEPRESSED OR HOPELESS: 0

## 2023-07-13 NOTE — PROGRESS NOTES
[facility] PROFESSIONAL [facility]  [address]  [facility] [address]  Dept: [phone]  Loc: [address] [place] [address] (:  1960) is a 60 y.o. female, here for evaluation of the following chief complaint(s):  Established New Doctor (Patient states she has had a colonoscopy and mammogram done a few years back in [address] [place] [address] Avenue not remember who or where they were performed.)      ASSESSMENT/PLAN:  1. Encounter for well adult exam with abnormal findings  -     Comprehensive Metabolic Panel; Future  -     CBC with Auto Differential; Future  -     TSH with Reflex; Future  -     Vitamin D 25 Hydroxy; Future  -     Lipid Panel; Future  -     Hemoglobin A1C; Future  2. Dyspnea, unspecified type  -     XR CHEST STANDARD (2 VW); Future  -     Full PFT Study With Bronchodilator; Future  -     Comprehensive Metabolic Panel; Future  -     CBC with Auto Differential; Future  -     TSH with Reflex; Future  3. Lipid screening  -     Lipid Panel; Future  4. Screening for diabetes mellitus  -     Hemoglobin A1C; Future    Will update yearly labs. Will check xray and PFT for recurrent shortness breath over the last 4-6 months. Patient advised on healthy diet and regular exercise. Return in about 4 weeks (around 8/10/2023) for follow up shortness of breath. SUBJECTIVE/OBJECTIVE:  Patient presents today for a new patient appointment. Former patient of a provider in [place], [facility] Srinivasa Guerra -[phone], fax [phone]. Health maintenance reviewed. Medical history significant for none. Current specialists include none. Current concerns include none. Had mammogram, pap smear and colonoscopy in the last year. Patient states over last 4-6 months has had recurrent episodes of SOB. States there is no predictable pattern. She works in a [employer] but denies any chemical or fume exposures. Was a previous smoker around 40 years ago. Denies exposure to second had smoke.  Did

## 2023-07-14 ENCOUNTER — HOSPITAL ENCOUNTER (OUTPATIENT)
Age: 63
Discharge: HOME | End: 2023-07-14
Payer: COMMERCIAL

## 2023-07-14 ENCOUNTER — TELEPHONE (OUTPATIENT)
Dept: FAMILY MEDICINE CLINIC | Age: 63
End: 2023-07-14

## 2023-07-14 DIAGNOSIS — Z13.1 SCREENING FOR DIABETES MELLITUS: ICD-10-CM

## 2023-07-14 DIAGNOSIS — Z13.220 LIPID SCREENING: ICD-10-CM

## 2023-07-14 DIAGNOSIS — R06.00 DYSPNEA, UNSPECIFIED TYPE: ICD-10-CM

## 2023-07-14 DIAGNOSIS — Z00.01 ENCOUNTER FOR WELL ADULT EXAM WITH ABNORMAL FINDINGS: ICD-10-CM

## 2023-07-14 LAB
25(OH)D3 SERPL-MCNC: 18 NG/ML (ref 30–100)
ALBUMIN SERPL BCG-MCNC: 3.9 G/DL (ref 3.5–5.1)
ALP SERPL-CCNC: 81 U/L (ref 38–126)
ALT SERPL W/O P-5'-P-CCNC: 20 U/L (ref 11–66)
ANION GAP SERPL CALC-SCNC: 10 MEQ/L (ref 8–16)
AST SERPL-CCNC: 22 U/L (ref 5–40)
BASOPHILS ABSOLUTE: 0 THOU/MM3 (ref 0–0.1)
BASOPHILS NFR BLD AUTO: 1.2 %
BILIRUB SERPL-MCNC: 1.4 MG/DL (ref 0.3–1.2)
BUN SERPL-MCNC: 10 MG/DL (ref 7–22)
CALCIUM SERPL-MCNC: 8.7 MG/DL (ref 8.5–10.5)
CHLORIDE SERPL-SCNC: 107 MEQ/L (ref 98–111)
CHOLEST SERPL-MCNC: 188 MG/DL (ref 100–199)
CO2 SERPL-SCNC: 25 MEQ/L (ref 23–33)
CREAT SERPL-MCNC: 0.7 MG/DL (ref 0.4–1.2)
DEPRECATED MEAN GLUCOSE BLD GHB EST-ACNC: 120 MG/DL (ref 70–126)
DEPRECATED RDW RBC AUTO: 41.6 FL (ref 35–45)
EOSINOPHIL NFR BLD AUTO: 5 %
EOSINOPHILS ABSOLUTE: 0.2 THOU/MM3 (ref 0–0.4)
ERYTHROCYTE [DISTWIDTH] IN BLOOD BY AUTOMATED COUNT: 12.3 % (ref 11.5–14.5)
GFR SERPL CREATININE-BSD FRML MDRD: > 60 ML/MIN/1.73M2
GLUCOSE SERPL-MCNC: 103 MG/DL (ref 70–108)
HBA1C MFR BLD HPLC: 6 % (ref 4.4–6.4)
HCT VFR BLD AUTO: 36.9 % (ref 37–47)
HDLC SERPL-MCNC: 42 MG/DL
HGB BLD-MCNC: 12.1 GM/DL (ref 12–16)
IMM GRANULOCYTES # BLD AUTO: 0.01 THOU/MM3 (ref 0–0.07)
IMM GRANULOCYTES NFR BLD AUTO: 0.3 %
LDLC SERPL CALC-MCNC: 123 MG/DL
LYMPHOCYTES ABSOLUTE: 1.6 THOU/MM3 (ref 1–4.8)
LYMPHOCYTES NFR BLD AUTO: 47.5 %
MCH RBC QN AUTO: 30.2 PG (ref 26–33)
MCHC RBC AUTO-ENTMCNC: 32.8 GM/DL (ref 32.2–35.5)
MCV RBC AUTO: 92 FL (ref 81–99)
MONOCYTES ABSOLUTE: 0.4 THOU/MM3 (ref 0.4–1.3)
MONOCYTES NFR BLD AUTO: 11.4 %
NEUTROPHILS NFR BLD AUTO: 34.6 %
NRBC BLD AUTO-RTO: 0 /100 WBC
PLATELET # BLD AUTO: 194 THOU/MM3 (ref 130–400)
PMV BLD AUTO: 10.7 FL (ref 9.4–12.4)
POTASSIUM SERPL-SCNC: 3.9 MEQ/L (ref 3.5–5.2)
PROT SERPL-MCNC: 6.8 G/DL (ref 6.1–8)
RBC # BLD AUTO: 4.01 MILL/MM3 (ref 4.2–5.4)
SEGMENTED NEUTROPHILS ABSOLUTE COUNT: 1.2 THOU/MM3 (ref 1.8–7.7)
SODIUM SERPL-SCNC: 142 MEQ/L (ref 135–145)
T4 FREE SERPL-MCNC: 0.94 NG/DL (ref 0.93–1.76)
TRIGL SERPL-MCNC: 116 MG/DL (ref 0–199)
TSH SERPL DL<=0.005 MIU/L-ACNC: 7.15 UIU/ML (ref 0.4–4.2)
WBC # BLD AUTO: 3.4 THOU/MM3 (ref 4.8–10.8)

## 2023-07-14 PROCEDURE — 83036 HEMOGLOBIN GLYCOSYLATED A1C: CPT

## 2023-07-14 PROCEDURE — 82306 VITAMIN D 25 HYDROXY: CPT

## 2023-07-14 PROCEDURE — 80053 COMPREHEN METABOLIC PANEL: CPT

## 2023-07-14 PROCEDURE — 80061 LIPID PANEL: CPT

## 2023-07-14 PROCEDURE — 85025 COMPLETE CBC W/AUTO DIFF WBC: CPT

## 2023-07-14 PROCEDURE — 84439 ASSAY OF FREE THYROXINE: CPT

## 2023-07-14 PROCEDURE — 36415 COLL VENOUS BLD VENIPUNCTURE: CPT

## 2023-07-14 PROCEDURE — 84443 ASSAY THYROID STIM HORMONE: CPT

## 2023-07-14 ASSESSMENT — ENCOUNTER SYMPTOMS
CHEST TIGHTNESS: 0
SHORTNESS OF BREATH: 1
VOMITING: 0
ABDOMINAL PAIN: 0
EYE PAIN: 0
NAUSEA: 0
SORE THROAT: 0
COUGH: 0

## 2023-07-14 NOTE — TELEPHONE ENCOUNTER
----- Message from ASHLIE Armendariz CNP sent at 7/14/2023  8:07 AM EDT -----  Chest xray is normal.

## 2023-07-17 ENCOUNTER — TELEPHONE (OUTPATIENT)
Dept: FAMILY MEDICINE CLINIC | Age: 63
End: 2023-07-17

## 2023-07-17 DIAGNOSIS — D64.9 ANEMIA, UNSPECIFIED TYPE: ICD-10-CM

## 2023-07-17 DIAGNOSIS — R73.03 PREDIABETES: ICD-10-CM

## 2023-07-17 DIAGNOSIS — E03.9 ACQUIRED HYPOTHYROIDISM: Primary | ICD-10-CM

## 2023-07-17 DIAGNOSIS — D72.819 LEUKOPENIA, UNSPECIFIED TYPE: ICD-10-CM

## 2023-07-17 DIAGNOSIS — E55.9 VITAMIN D DEFICIENCY: ICD-10-CM

## 2023-07-17 PROBLEM — S52.609A CLOSED FRACTURE OF DISTAL END OF ULNA: Status: ACTIVE | Noted: 2023-07-17

## 2023-07-17 RX ORDER — LEVOTHYROXINE SODIUM 0.03 MG/1
25 TABLET ORAL DAILY
Qty: 30 TABLET | Refills: 0 | Status: SHIPPED | OUTPATIENT
Start: 2023-07-17

## 2023-07-17 RX ORDER — ERGOCALCIFEROL 1.25 MG/1
50000 CAPSULE ORAL WEEKLY
Qty: 12 CAPSULE | Refills: 1 | Status: SHIPPED | OUTPATIENT
Start: 2023-07-17

## 2023-07-17 NOTE — TELEPHONE ENCOUNTER
Called and spoke with patient and Kodi Burger (spouse). Detailed report of lab results given. Kodi Burger has verbalized an understanding and has agreed to  prescriptions. Lab orders have been mailed to patient's PO Box with description of when to have drawn labeled on them. They are to return call with any questions.

## 2023-07-17 NOTE — TELEPHONE ENCOUNTER
----- Message from ASHLIE Dejesus CNP sent at 7/17/2023  8:14 AM EDT -----  Vitamin D is low. I would like to start once a week Vitamin D for 12 weeks and we will repeat this then in 12 weeks. TSH is elevated which indicated hypothyroidism so we need to start thyroid medication. I am going to send in levothyroxine at 25 mcg. This needs to be taken first thing in the morning 30 minutes before any other medication or food. We will recheck this level in 4 weeks after you start the medication. White and red blood cell counts are low so I will repeat a CBC when we recheck the thyroid labs. I will order the labs now and you can stop in and have them drawn when they are due. A1C is 6.0 and is in prediabetic range. You will want to work on low carbohydrate diet and regular exercise to prevent the diabetes. Carbohydrates include sweets and things like pasta, bread and rice. Potatoes are also high in carbohydrates. All other labs are good.

## 2023-07-17 NOTE — TELEPHONE ENCOUNTER
----- Message from [facility] ASHLIE Serrano CNP sent at 7/17/2023  8:14 AM EDT -----  Vitamin D is low. I would like to start once a week Vitamin D for 12 weeks and we will repeat this then in 12 weeks. TSH is elevated which indicated hypothyroidism so we need to start thyroid medication. I am going to send in levothyroxine at 25 mcg. This needs to be taken first thing in the morning 30 minutes before any other medication or food. We will recheck this level in 4 weeks after you start the medication. White and red blood cell counts are low so I will repeat a CBC when we recheck the thyroid labs. I will order the labs now and you can stop in and have them drawn when they are due. A1C is 6.0 and is in prediabetic range. You will want to work on low carbohydrate diet and regular exercise to prevent the diabetes. Carbohydrates include sweets and things like pasta, bread and rice. Potatoes are also high in carbohydrates. All other labs are good.

## 2023-08-14 ENCOUNTER — OFFICE VISIT (OUTPATIENT)
Dept: FAMILY MEDICINE CLINIC | Age: 63
End: 2023-08-14
Payer: COMMERCIAL

## 2023-08-14 VITALS
BODY MASS INDEX: 25.72 KG/M2 | TEMPERATURE: 98.3 F | OXYGEN SATURATION: 98 % | SYSTOLIC BLOOD PRESSURE: 118 MMHG | HEART RATE: 81 BPM | HEIGHT: 65 IN | RESPIRATION RATE: 16 BRPM | WEIGHT: 154.4 LBS | DIASTOLIC BLOOD PRESSURE: 70 MMHG

## 2023-08-14 DIAGNOSIS — E03.9 ACQUIRED HYPOTHYROIDISM: ICD-10-CM

## 2023-08-14 DIAGNOSIS — R06.00 DYSPNEA, UNSPECIFIED TYPE: ICD-10-CM

## 2023-08-14 DIAGNOSIS — E01.0 THYROMEGALY: Primary | ICD-10-CM

## 2023-08-14 PROCEDURE — 99214 OFFICE O/P EST MOD 30 MIN: CPT | Performed by: NURSE PRACTITIONER

## 2023-08-14 NOTE — PROGRESS NOTES
[facility] PROFESSIONAL [facility]  [address] [facility]  Dept: [phone]  Loc: [address] (:  1960) is a 60 y.o. female, here for evaluation of the following chief complaint(s):  Shortness of Breath (F/U for SOB. Patient states she has noticed a little improvement and has gotten the x-ray done but hasn't done the PFT do to states she never received a call. And hasn't gotten her blood work done and plans to get it done on the  also would like blood work done for arthritis and calcium levels if not already ordered.  ) and Hair/Scalp Problem (C/O thinning hair and noticed about a year ago )      ASSESSMENT/PLAN:  1. Thyromegaly  -     US THYROID; Future  2. Acquired hypothyroidism  -     US THYROID; Future  3. Dyspnea, unspecified type  Comments:  Improving. Has not had PFT yet. Will reorder. Orders:  -     Full PFT Study With Bronchodilator; Future    Patient will have thyroid labs drawn and will adjust medication as needed. Will obtain US thyroid. Return in about 6 weeks (around 2023) for follow thyroid, SOB. SUBJECTIVE/OBJECTIVE:  History is obtained through . Patient presents for follow up on breathing concerns. States she was not contacted to do the PFT. XR was normal. Feels breathing is better. States she has been loosing her hair for a few months. Discussed that this can be a symptom of thyroid disease. Hypothyroidism newly diagnosed. Has started levothyroxine 25 mcg and states actually hair loss has improved some since starting the medication. No other concerns at this time. Review of Systems   Constitutional:  Negative for chills and fever. HENT:  Negative for congestion and sore throat. Eyes:  Negative for pain and visual disturbance. Respiratory:  Negative for cough, chest tightness and shortness of breath. Cardiovascular:  Negative for chest pain and palpitations.

## 2023-08-15 ASSESSMENT — ENCOUNTER SYMPTOMS
ABDOMINAL PAIN: 0
COUGH: 0
EYE PAIN: 0
NAUSEA: 0
SHORTNESS OF BREATH: 0
CHEST TIGHTNESS: 0
SORE THROAT: 0
VOMITING: 0

## 2023-08-18 LAB
BASOPHILS ABSOLUTE: 0 /ΜL
BASOPHILS RELATIVE PERCENT: 1 %
EOSINOPHILS ABSOLUTE: 0.1 /ΜL
EOSINOPHILS RELATIVE PERCENT: 3 %
FERRITIN: 253 NG/ML (ref 9–150)
HCT VFR BLD CALC: 36.7 % (ref 36–46)
HEMOGLOBIN: 12 G/DL (ref 12–16)
IRON: 117
LYMPHOCYTES ABSOLUTE: 1.9 /ΜL
LYMPHOCYTES RELATIVE PERCENT: 51 %
MCH RBC QN AUTO: 29.8 PG
MCHC RBC AUTO-ENTMCNC: 32.7 G/DL
MCV RBC AUTO: 91 FL
MONOCYTES ABSOLUTE: 0.3 /ΜL
MONOCYTES RELATIVE PERCENT: 7 %
NEUTROPHILS ABSOLUTE: 1.4 /ΜL
NEUTROPHILS RELATIVE PERCENT: 38 %
PDW BLD-RTO: 12.8 %
PLATELET # BLD: 215 K/ΜL
PMV BLD AUTO: NORMAL FL
RBC # BLD: 4.03 10^6/ΜL
TOTAL IRON BINDING CAPACITY: 298
TSH SERPL DL<=0.05 MIU/L-ACNC: 2.22 UIU/ML
WBC # BLD: 3.7 10^3/ML

## 2023-08-22 ENCOUNTER — COMMUNITY OUTREACH (OUTPATIENT)
Dept: FAMILY MEDICINE CLINIC | Age: 63
End: 2023-08-22

## 2023-08-24 ENCOUNTER — TELEPHONE (OUTPATIENT)
Dept: FAMILY MEDICINE CLINIC | Age: 63
End: 2023-08-24

## 2023-08-24 DIAGNOSIS — E03.9 ACQUIRED HYPOTHYROIDISM: ICD-10-CM

## 2023-08-24 RX ORDER — LEVOTHYROXINE SODIUM 0.03 MG/1
25 TABLET ORAL DAILY
Qty: 30 TABLET | Refills: 2 | Status: SHIPPED | OUTPATIENT
Start: 2023-08-24

## 2023-08-24 NOTE — TELEPHONE ENCOUNTER
----- Message from ASHLIE Martinez CNP sent at 8/24/2023 10:43 AM EDT -----  Thyroid lab is back in normal range. Continue current dose of thyroid medication. I will send in a refill.  Iron and CBC were normal.

## 2023-09-01 ENCOUNTER — HOSPITAL ENCOUNTER (OUTPATIENT)
Dept: ULTRASOUND IMAGING | Age: 63
Discharge: HOME | End: 2023-09-01
Payer: COMMERCIAL

## 2023-09-01 ENCOUNTER — HOSPITAL ENCOUNTER (OUTPATIENT)
Dept: PULMONOLOGY | Age: 63
Discharge: HOME | End: 2023-09-01
Payer: COMMERCIAL

## 2023-09-01 DIAGNOSIS — R06.00 DYSPNEA, UNSPECIFIED TYPE: ICD-10-CM

## 2023-09-01 DIAGNOSIS — E01.0 THYROMEGALY: ICD-10-CM

## 2023-09-01 DIAGNOSIS — E03.9 ACQUIRED HYPOTHYROIDISM: ICD-10-CM

## 2023-09-01 PROCEDURE — 94726 PLETHYSMOGRAPHY LUNG VOLUMES: CPT

## 2023-09-01 PROCEDURE — 76536 US EXAM OF HEAD AND NECK: CPT

## 2023-09-01 PROCEDURE — 94060 EVALUATION OF WHEEZING: CPT

## 2023-09-01 PROCEDURE — 94729 DIFFUSING CAPACITY: CPT

## 2023-09-05 ENCOUNTER — TELEPHONE (OUTPATIENT)
Dept: FAMILY MEDICINE CLINIC | Age: 63
End: 2023-09-05

## 2023-09-05 NOTE — TELEPHONE ENCOUNTER
----- Message from Florinda Sandhoff, APRN - CNP sent at 9/5/2023 10:43 AM EDT -----  Advise patient US showed two very small nodules. I would like to recheck with another US in 6 months just to make sure they are not changing. Nothing concerning right now but definitely want to follow up on them in 6 months. Continue levothyroxine.

## 2023-09-06 DIAGNOSIS — E04.2 MULTIPLE THYROID NODULES: Primary | ICD-10-CM

## 2023-09-14 ENCOUNTER — TELEPHONE (OUTPATIENT)
Dept: FAMILY MEDICINE CLINIC | Age: 63
End: 2023-09-14

## 2023-09-14 DIAGNOSIS — R06.00 DYSPNEA, UNSPECIFIED TYPE: Primary | ICD-10-CM

## 2023-09-14 RX ORDER — ALBUTEROL SULFATE 90 UG/1
2 AEROSOL, METERED RESPIRATORY (INHALATION) EVERY 4 HOURS PRN
Qty: 18 G | Refills: 2 | Status: SHIPPED | OUTPATIENT
Start: 2023-09-14

## 2023-09-14 NOTE — TELEPHONE ENCOUNTER
Liss Alley informed of PFT results and verbalized understanding. Liss Alley reports when she exerts herself, or if she laughs it doesn't take long and she has like coughing spells.

## 2023-10-31 DIAGNOSIS — E03.9 ACQUIRED HYPOTHYROIDISM: ICD-10-CM

## 2023-10-31 RX ORDER — LEVOTHYROXINE SODIUM 0.03 MG/1
25 TABLET ORAL DAILY
Qty: 90 TABLET | Refills: 0 | Status: SHIPPED | OUTPATIENT
Start: 2023-10-31

## 2023-10-31 NOTE — TELEPHONE ENCOUNTER
Linda Wilkins called requesting a refill on the following medications:  Requested Prescriptions     Pending Prescriptions Disp Refills    levothyroxine (SYNTHROID) 25 MCG tablet 90 tablet 0     Sig: Take 1 tablet by mouth daily       Date of last visit: 8/14/2023  Date of next visit (if applicable):Visit date not found  Date of last refill: 8/24/2023  [facility]arm[facility] Name: [facility] [place]

## 2023-11-01 DIAGNOSIS — R06.00 DYSPNEA, UNSPECIFIED TYPE: ICD-10-CM

## 2024-03-15 ENCOUNTER — HOSPITAL ENCOUNTER (OUTPATIENT)
Dept: ULTRASOUND IMAGING | Age: 64
Discharge: HOME | End: 2024-03-15
Payer: COMMERCIAL

## 2024-03-15 DIAGNOSIS — E04.2 MULTIPLE THYROID NODULES: ICD-10-CM

## 2024-03-15 PROCEDURE — 76536 US EXAM OF HEAD AND NECK: CPT
